# Patient Record
Sex: FEMALE | Race: BLACK OR AFRICAN AMERICAN | NOT HISPANIC OR LATINO | URBAN - METROPOLITAN AREA
[De-identification: names, ages, dates, MRNs, and addresses within clinical notes are randomized per-mention and may not be internally consistent; named-entity substitution may affect disease eponyms.]

---

## 2020-01-10 ENCOUNTER — EMERGENCY (EMERGENCY)
Facility: HOSPITAL | Age: 25
LOS: 0 days | Discharge: HOME | End: 2020-01-10
Payer: MEDICAID

## 2020-01-10 VITALS
SYSTOLIC BLOOD PRESSURE: 126 MMHG | HEIGHT: 61 IN | WEIGHT: 103.62 LBS | HEART RATE: 77 BPM | DIASTOLIC BLOOD PRESSURE: 83 MMHG | TEMPERATURE: 98 F | RESPIRATION RATE: 15 BRPM | OXYGEN SATURATION: 100 %

## 2020-01-10 DIAGNOSIS — M25.511 PAIN IN RIGHT SHOULDER: ICD-10-CM
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
## 2020-01-10 DIAGNOSIS — S09.90XA UNSPECIFIED INJURY OF HEAD, INITIAL ENCOUNTER: ICD-10-CM

## 2020-01-10 PROCEDURE — 73060 X-RAY EXAM OF HUMERUS: CPT | Mod: 26,RT

## 2020-01-10 PROCEDURE — 70450 CT HEAD/BRAIN W/O DYE: CPT | Mod: 26

## 2020-01-10 PROCEDURE — 99284 EMERGENCY DEPT VISIT MOD MDM: CPT

## 2020-01-10 PROCEDURE — 73030 X-RAY EXAM OF SHOULDER: CPT | Mod: 26,RT

## 2020-01-10 PROCEDURE — 72125 CT NECK SPINE W/O DYE: CPT | Mod: 26

## 2020-01-10 PROCEDURE — 76377 3D RENDER W/INTRP POSTPROCES: CPT | Mod: 26

## 2020-01-10 RX ORDER — CYCLOBENZAPRINE HYDROCHLORIDE 10 MG/1
10 TABLET, FILM COATED ORAL ONCE
Refills: 0 | Status: COMPLETED | OUTPATIENT
Start: 2020-01-10 | End: 2020-01-10

## 2020-01-10 RX ADMIN — CYCLOBENZAPRINE HYDROCHLORIDE 10 MILLIGRAM(S): 10 TABLET, FILM COATED ORAL at 15:15

## 2020-01-10 NOTE — ED PROVIDER NOTE - OBJECTIVE STATEMENT
25 yo F with no PMHx presents to the ED c/p head, neck and R shoulder pain s/p fall down the stairs last night.  Pt states she was intoxicated and fell down about 5 stairs in her home at about 4am last night, hitting her head and shoulder.  Pt denies AC use, visual changes.      ROS: No fever/chills. No eye pain/changes in vision, No ear pain/sore throat/dysphagia, No chest pain/palpitations. No SOB/cough/. No abdominal pain, N/V/D, no black/bloody bm. No dysuria/frequency/discharge, No headache. No Dizziness.    No rashes or breaks in skin. No numbness/tingling/weakness.

## 2020-01-10 NOTE — ED PROVIDER NOTE - PHYSICAL EXAMINATION
Gen: Alert, NAD, well appearing, not toxic  Head: + bruising lateral to R eye, NC, AT, PERRL, EOMI, normal lids/conjunctiva  ENT: B TM WNL, normal hearing, patent oropharynx without erythema/exudate, uvula midline  Neck: +supple, no spinal tenderness/meningismus/JVD, +Trachea midline  Pulm: Bilateral BS, normal resp effort, no wheeze/stridor/retractions  CV: RRR, no M/R/G, +dist pulses  Abd: soft, NT/ND, +BS, no hepatosplenomegaly  Mskel: +TTP R trapezius, R shoulder and diffuse TTP R upper arm, no edema/erythema/cyanosis no saddle anesthesia, no midline spinal tenderness ctls, neg slr, str 5/5 x4 bl, sensations intact, gait ok  Skin: no rash  Neuro: AAOx3, no sensory/motor deficits, CN 2-12 intact

## 2020-01-10 NOTE — ED ADULT TRIAGE NOTE - CHIEF COMPLAINT QUOTE
Fell down stairs last night c/o pain to right shoulder and right side of head, tiny bruising to right forehead. Admits to drinking alcohol last night.

## 2020-01-10 NOTE — ED PROVIDER NOTE - PATIENT PORTAL LINK FT
You can access the FollowMyHealth Patient Portal offered by [facility] by registering at the following website: [email or web address]. By joining [employer]’s FollowMyHealth portal, you will also be able to view your health information using other applications (apps) compatible with our system.

## 2020-01-10 NOTE — ED PROVIDER NOTE - CLINICAL SUMMARY MEDICAL DECISION MAKING FREE TEXT BOX
26 yo F with no PMHx presents c/o pain s/p fall down stairs at 4am last night while intoxicated, will obtain CT head, XRs and reassess 26 yo F with no PMHx presents c/o pain s/p fall down stairs at 4am last night while intoxicated, will obtain CT head, XRs and reassess.  CTs negative, XRs with possible mild AC separation, pt placed in sling, advised ortho f/u, plan to d/c home with outpt f/u, pt agreeable to d/c and plan of care, all questions answered, return precautions given

## 2020-01-10 NOTE — ED PROVIDER NOTE - CARE PLAN
Principal Discharge DX:	Fall down stairs, initial encounter Principal Discharge DX:	Fall down stairs, initial encounter  Secondary Diagnosis:	Acute pain of left shoulder  Secondary Diagnosis:	Injury of head, initial encounter

## 2020-01-10 NOTE — ED PROVIDER NOTE - NSFOLLOWUPINSTRUCTIONS_ED_ALL_ED_FT
Head Injury    WHAT YOU NEED TO KNOW:    A head injury is most often caused by a blow to the head. This may occur from a fall, bicycle injury, sports injury, being struck in the head, or a motor vehicle accident.     DISCHARGE INSTRUCTIONS:    Call 911 or have someone else call for any of the following:     You cannot be woken.      You have a seizure.      You stop responding to others or you faint.      You have blurry or double vision.      Your speech becomes slurred or confused.      You have arm or leg weakness, loss of feeling, or new problems with coordination.      Your pupils are larger than usual or one pupil is a different size than the other.       You have blood or clear fluid coming out of your ears or nose.    Return to the emergency department if:     You have repeated or forceful vomiting.      You feel confused.      Your headache gets worse or becomes severe.      You or someone caring for you notices that you are harder to wake than usual.    Contact your healthcare provider if:     Your symptoms last longer than 6 weeks after the injury.      You have questions or concerns about your condition or care.    Medicines:     Acetaminophen decreases pain. Acetaminophen is available without a doctor's order. Ask how much to take and how often to take it. Follow directions. Acetaminophen can cause liver damage if not taken correctly.      Take your medicine as directed. Contact your healthcare provider if you think your medicine is not helping or if you have side effects. Tell him or her if you are allergic to any medicine. Keep a list of the medicines, vitamins, and herbs you take. Include the amounts, and when and why you take them. Bring the list or the pill bottles to follow-up visits. Carry your medicine list with you in case of an emergency.    Self-care:     Rest or do quiet activities for 24 to 48 hours. Limit your time watching TV, using the computer, or doing tasks that require a lot of thinking. Slowly return to your normal activities as directed. Do not play sports or do activities that may cause you to get hit in the head. Ask your healthcare provider when you can return to sports.       Apply ice on your head for 15 to 20 minutes every hour or as directed. Use an ice pack, or put crushed ice in a plastic bag. Cover it with a towel before you apply it to your skin. Ice helps prevent tissue damage and decreases swelling and pain.       Have someone stay with you for 24 hours or as directed. This person can monitor you for complications and call 911. When you are awake the person should ask you a few questions to see if you are thinking clearly. An example would be to ask your name or your address.     Prevent another head injury:     Wear a helmet that fits properly. Do this when you play sports, or ride a bike, scooter, or skateboard. Helmets help decrease your risk of a serious head injury. Talk to your healthcare provider about other ways you can protect yourself if you play sports.      Wear your seat belt every time you are in a car. This helps to decrease your risk for a head injury if you are in a car accident.     Follow up with your healthcare provider as directed: Write down your questions so you remember to ask them during your visits.     Sprain    WHAT YOU NEED TO KNOW:    A sprain happens when a ligament is stretched or torn. Ligaments are tough tissues that connect bones. Ligaments support your joints and keep your bones in place. They allow you to lift, lower, or rotate your arms and legs. A sprain may involve one or more ligaments.     DISCHARGE INSTRUCTIONS:    Return to the emergency department if:     You have numbness or tingling below the injury, such as in your fingers or toes.      The skin over your sprained area is blue or pale.       Your pain has increased or returned, even after you take pain medicine.    Contact your healthcare provider if:     Your symptoms do not better.      Your swelling has increased or returned.      Your joint becomes more weak or unstable.      You have questions or concerns about your condition or care.    Medicines:     Prescription pain medicine may be given. Ask how to take this medicine safely.      Acetaminophen decreases pain and fever. It is available without a doctor's order. Ask how much to take and how often to take it. Follow directions. Acetaminophen can cause liver damage if not taken correctly.      NSAIDs, such as ibuprofen, help decrease swelling, pain, and fever. This medicine is available with or without a doctor's order. NSAIDs can cause stomach bleeding or kidney problems in certain people. If you take blood thinner medicine, always ask your healthcare provider if NSAIDs are safe for you. Always read the medicine label and follow directions.      Take your medicine as directed. Contact your healthcare provider if you think your medicine is not helping or if you have side effects. Tell him or her if you are allergic to any medicine. Keep a list of the medicines, vitamins, and herbs you take. Include the amounts, and when and why you take them. Bring the list or the pill bottles to follow-up visits. Carry your medicine list with you in case of an emergency.    Support devices: Support services, such as an elastic bandage, splint, brace, or cast may be needed. These devices limit your movement and protect your joint. You may need to use crutches if the sprain is in your leg. This will help decrease your pain as you move around.     Self-care:     Rest your joint so that it can heal. Return to normal activities as directed.      Apply ice on your injury for 15 to 20 minutes every hour or as directed. Use an ice pack, or put crushed ice in a plastic bag. Cover it with a towel. Ice helps prevent tissue damage and decreases swelling and pain.      Compress the injured area as directed. Ask your healthcare provider if you should wrap an elastic bandage around your injured ligament. An elastic bandage provides support and helps decrease swelling and movement so your joint can heal.       Elevate the injured area above the level of your heart as often as you can. This will help decrease swelling and pain. Prop the injured area on pillows or blankets to keep it elevated comfortably.     Physical therapy: A physical therapist teaches you exercises to help improve movement and strength, and to decrease pain.     Prevent another sprain: Regular exercise can strengthen your muscles and help prevent another injury. Do the following before you begin or return to regular exercise or sports training:     Ask your healthcare provider about the activities you can do. Find out how long your ligament needs to heal. Do not do any physical activity until your healthcare provider says it is okay. If you start activity too soon, you may develop a more serious injury.       Always warm up and stretch before your regular exercise, sport, or physical activity.       Take it slow. Slowly increase how often and how long you exercise or train. Sudden increases in how often you train may cause you to overstretch or tear your ligament.       Use the right equipment. Always wear shoes that fit well and are made for the activity that you are doing. You may also use ankle supports, elbow and knee pads, or braces.     Follow up with your healthcare provider as directed: Write down your questions so you remember to ask them during your visits.

## 2023-12-04 NOTE — ED PROCEDURE NOTE - NS_EDPROVIDERDISPOUSERTYPE_ED_A_ED
I have personally evaluated and examined the patient. The Attending was available to me as a supervising provider if needed. yes